# Patient Record
Sex: MALE | Race: WHITE
[De-identification: names, ages, dates, MRNs, and addresses within clinical notes are randomized per-mention and may not be internally consistent; named-entity substitution may affect disease eponyms.]

---

## 2019-08-04 ENCOUNTER — HOSPITAL ENCOUNTER (EMERGENCY)
Dept: HOSPITAL 92 - SCSER | Age: 44
Discharge: HOME | End: 2019-08-04
Payer: COMMERCIAL

## 2019-08-04 DIAGNOSIS — Z79.899: ICD-10-CM

## 2019-08-04 DIAGNOSIS — M25.512: Primary | ICD-10-CM

## 2019-08-04 DIAGNOSIS — E66.9: ICD-10-CM

## 2019-08-04 DIAGNOSIS — F17.210: ICD-10-CM

## 2019-08-04 DIAGNOSIS — M54.6: ICD-10-CM

## 2019-08-04 PROCEDURE — 72072 X-RAY EXAM THORAC SPINE 3VWS: CPT

## 2019-08-04 PROCEDURE — 96372 THER/PROPH/DIAG INJ SC/IM: CPT

## 2019-08-04 NOTE — RAD
Exam:3 views left shoulder



HISTORY: Pain.



COMPARISON: None



FINDINGS: Glenohumeral joint space preserved. No fracture or dislocation.



IMPRESSION: Unremarkable 3 views left shoulder



Reported By: Edelmira Jones 

Electronically Signed:  8/4/2019 1:20 PM

## 2019-08-04 NOTE — RAD
Exam: 3 views thoracic spine



HISTORY: Left shoulder pain. Upper back pain



FINDINGS: AP, lateral and swimmer's view demonstrate mild loss of disc space height upper thoracic sp
ine and distal thoracic spine. Mild osteophyte formation. No fracture or malalignment. 12 thoracic

type vertebral bodies.



IMPRESSION: Mild degenerative changes.



Reported By: Edelmira Jones 

Electronically Signed:  8/4/2019 1:22 PM

## 2020-06-24 ENCOUNTER — HOSPITAL ENCOUNTER (OUTPATIENT)
Dept: HOSPITAL 92 - DTY/OP | Age: 45
Discharge: HOME | End: 2020-06-24
Attending: SURGERY
Payer: COMMERCIAL

## 2020-06-24 DIAGNOSIS — E66.01: Primary | ICD-10-CM

## 2020-06-24 PROCEDURE — 97802 MEDICAL NUTRITION INDIV IN: CPT
